# Patient Record
Sex: MALE | Race: ASIAN | NOT HISPANIC OR LATINO | Employment: FULL TIME | URBAN - METROPOLITAN AREA
[De-identification: names, ages, dates, MRNs, and addresses within clinical notes are randomized per-mention and may not be internally consistent; named-entity substitution may affect disease eponyms.]

---

## 2019-01-09 ENCOUNTER — OFFICE VISIT (OUTPATIENT)
Dept: FAMILY MEDICINE | Facility: CLINIC | Age: 24
End: 2019-01-09
Payer: COMMERCIAL

## 2019-01-09 VITALS
OXYGEN SATURATION: 98 % | DIASTOLIC BLOOD PRESSURE: 68 MMHG | TEMPERATURE: 101.6 F | SYSTOLIC BLOOD PRESSURE: 129 MMHG | RESPIRATION RATE: 16 BRPM | WEIGHT: 157 LBS | HEART RATE: 122 BPM

## 2019-01-09 DIAGNOSIS — J06.9 VIRAL URI WITH COUGH: Primary | ICD-10-CM

## 2019-01-09 DIAGNOSIS — R50.9 FEVER, UNSPECIFIED FEVER CAUSE: ICD-10-CM

## 2019-01-09 LAB
DEPRECATED S PYO AG THROAT QL EIA: NORMAL
FLUAV+FLUBV AG SPEC QL: NEGATIVE
FLUAV+FLUBV AG SPEC QL: NEGATIVE
SPECIMEN SOURCE: NORMAL
SPECIMEN SOURCE: NORMAL

## 2019-01-09 PROCEDURE — 87804 INFLUENZA ASSAY W/OPTIC: CPT | Performed by: NURSE PRACTITIONER

## 2019-01-09 PROCEDURE — 87081 CULTURE SCREEN ONLY: CPT | Performed by: NURSE PRACTITIONER

## 2019-01-09 PROCEDURE — 99203 OFFICE O/P NEW LOW 30 MIN: CPT | Performed by: NURSE PRACTITIONER

## 2019-01-09 PROCEDURE — 87880 STREP A ASSAY W/OPTIC: CPT | Performed by: NURSE PRACTITIONER

## 2019-01-09 ASSESSMENT — PAIN SCALES - GENERAL: PAINLEVEL: EXTREME PAIN (8)

## 2019-01-09 NOTE — PATIENT INSTRUCTIONS
Rapid strep negative, awaiting culture. We will call you in the next 24-48 hours only if positive.  Negative influenza  Push fluids, rest  Gargle with warm salt water  Tylenol or ibuprofen as needed for pain or fever  No work until 24 hours after fever is gone  If worsening or not improving in 3-5 days, follow up with primary care provider, sooner if needed      At [facility], we strive to deliver an exceptional experience to you, every time we see you.  If you receive a survey in the mail, please send us back your thoughts. We really do value your feedback.    Based on your medical history, these are the current health maintenance/preventive care services that you are due for (some may have been done at this visit.)  Health Maintenance Due   Topic Date Due     PHQ-2 Q1 YR  05/30/2005     HIV SCREEN (SYSTEM ASSIGNED)  05/30/2011     DTAP/TDAP/TD IMMUNIZATION (1 - Tdap) 05/30/2018     INFLUENZA VACCINE (1) 09/01/2018         Suggested websites for health information:  Www.PixelFlow.org : Up to date and easily searchable information on multiple topics.  Www.medlineplus.gov : medication info, interactive tutorials, watch real surgeries online  Www.familydoctor.org : good info from the Academy of Family Physicians  Www.cdc.gov : public health info, travel advisories, epidemics (H1N1)  Www.aap.org : children's health info, normal development, vaccinations  Www.health.state.[place].us : [place] dept of health, public health issues in [place], N1N1    Your care team:                            Family Medicine Internal Medicine   MD Garrett Bennett MD Shantel Branch-Fleming, MD Katya Georgiev PA-C Nam Ho, MD Pediatrics   SUKHDEEP Rushing, MD Cassandra Escalante CNP, MD Deborah Mielke, MD Kim Thein, APRN CNP      Clinic hours: Monday - Thursday 7 am-7 pm; Fridays 7 am-5 pm.   Urgent care: Monday - Friday 11 am-9 pm; Saturday and Sunday 9  am-5 pm.  Pharmacy : Monday -Thursday 8 am-8 pm; Friday 8 am-6 pm; Saturday and Sunday 9 am-5 pm.     Clinic: [phone]   Pharmacy: [phone]    Patient Education     Viral Upper Respiratory Illness (Adult)  You have a viral upper respiratory illness (URI), which is another term for the common cold. This illness is contagious during the first few days. It is spread through the air by coughing and sneezing. It may also be spread by direct contact (touching the sick person and then touching your own eyes, nose, or mouth). Frequent handwashing will decrease risk of spread. Most viral illnesses go away within 7 to 10 days with rest and simple home remedies. Sometimes the illness may last for several weeks. Antibiotics will not kill a virus, and they are generally not prescribed for this condition.    Home care    If symptoms are severe, rest at home for the first 2 to 3 days. When you resume activity, don't let yourself get too tired.    Don't smoke. If you need help stopping, talk with your healthcare provider.    Avoid being exposed to cigarette smoke (yours or others ).    You may use acetaminophen or ibuprofen to control pain and fever, unless another medicine was prescribed. If you have chronic liver or kidney disease, have ever had a stomach ulcer or gastrointestinal bleeding, or are taking blood-thinning medicines, talk with your healthcare provider before using these medicines. Aspirin should never be given to anyone under 18 years of age who is ill with a viral infection or fever. It may cause severe liver or brain damage.    Your appetite may be poor, so a light diet is fine. Stay well hydrated by drinking 6 to 8 glasses of fluids per day (water, soft drinks, juices, tea, or soup). Extra fluids will help loosen secretions in the nose and lungs.    Over-the-counter cold medicines will not shorten the length of time you re sick, but they may be helpful for the following symptoms: cough, sore throat,  and nasal and sinus congestion. If you take prescription medicines, ask your healthcare provider or pharmacist which over-the-counter medicines are safe to use. (Note: Don't use decongestants if you have high blood pressure.)  Follow-up care  Follow up with your healthcare provider, or as advised.  When to seek medical advice  Call your healthcare provider right away if any of these occur:    Cough with lots of colored sputum (mucus)    Severe headache; face, neck, or ear pain    Difficulty swallowing due to throat pain    Fever of 100.4 F (38 C) or higher, or as directed by your healthcare provider  Call 911  Call 911 if any of these occur:    Chest pain, shortness of breath, wheezing, or difficulty breathing    Coughing up blood    Very severe pain with swallowing, especially if it goes along with a muffled voice   Date Last Reviewed: 6/1/2018 2000-2018 [employer]. [address] [place] [address]. All rights reserved. This information is not intended as a substitute for professional medical care. Always follow your healthcare professional's instructions.           Patient Education     Fever Control (Adult)  A fever is a normal reaction of your body to an illness. The temperature itself usually isn t harmful.  It actually helps your body fight infections. You don t need to treat a fever unless you feel very uncomfortable.   Home care  Follow these tips to take care of yourself at home:    If you feel warm, check your temperature.    Dress in light clothing. This will help you lose extra body heat through your skin. The fever will go up if you wear extra layers or wrap in blankets.    Fever causes your body to lose water through evaporation. Drink plenty of fluids. These include water, juice, clear sodas, ginger ale, or lemonade.  Fever medicines  You can take acetaminophen every 4 to 6 hours if:    You feel very uncomfortable    Your oral temperature is 100.4 F (38 C) or higher  If you  can t take or keep down oral medicine, ask your pharmacist for acetaminophen suppositories. You don t need a prescription for these.  If the fever doesn t get better within 1 hour after you take acetaminophen, take ibuprofen. If this works, keep taking the ibuprofen every 6 to 8 hours.  If you have chronic liver or kidney disease, talk with your healthcare provider before taking these medicines. Also talk with your provider if you ever had a stomach ulcer or GI (gastrointestinal) bleeding.  If either medicine alone doesn t keep the fever down, you may switch off between the 2 medicines every 3 to 4 hours. But do this only if your healthcare provider has told you to. For example, take ibuprofen. Wait 3 hours. Then take acetaminophen. Wait 3 hours. Take ibuprofen, and so on. Follow your provider s instructions exactly.  Don't give aspirin to anyone younger than age 19 who is ill with a fever. Aspirin can cause serious side effects such as liver damage and Reye syndrome. Although rare, Reye syndrome is a very serious illness usually found in children younger than age 15. The syndrome is closely linked to the use of aspirin or aspirin-containing medicine during viral infection.  Follow-up care  Follow up with your healthcare provider if you don't get better after 48 hours.  When to seek medical advice  Call your healthcare provider right away if any of these occur:    Fever, as directed by your healthcare provider, or:  ? Fever of 100.4 F (38 C) or above lasting for 24 to 48 hours  ? Fever lasting more than 3 days, even without other symptoms  ? Fever that happens after visiting a foreign country  ? Fever that happens within a month after visiting a country with malaria. Malaria is a serious illness. A fever can still be malaria even if you took medicine to prevent it. The medicine does not work in all cases.    Confusion or trouble thinking    Headache or stiff neck    Flat, small, purplish red spots on your  skin    Low blood pressure    Fast heart rate    Fast (rapid) breathing    You are pregnant    You just had surgery, another medical procedure, or were just discharged from the hospital    Use of medicines that suppress the immune system (immunosuppressants). These include Prednisone, cancer medicines, and organ transplant rejection medicines. If you are not sure about whether your medicines suppress your immune system, ask your healthcare provider.  Call 911  Someone should call 911 if you:    Are having trouble breathing or shortness of breath    Are unresponsive  Important reminder  Call your healthcare provider if you get a fever after visiting a place where infectious diseases are common. Many people  a cold or other virus while traveling. This usually goes away without a problem. But, some places have more serious diseases. Fever with certain other symptoms may mean you have a serious illness. Symptoms to watch for include diarrhea, skin rashes, insect bites, and skin boils, or infections. Your provider may ask you:    What you did on your trip    How long you were there    Where you stayed (hotel, native house, tent)    What you ate and drank    If you were bitten by insects or other bugs    If you swam in freshwater    If you had sex or got a tattoo or piercing while you were there  Check the CDC to get more information about specific infectious diseases in the areas you have traveled.  Date Last Reviewed: 1/1/2017 2000-2018 [employer]. [address] [place] [address], [place] [address]. All rights reserved. This information is not intended as a substitute for professional medical care. Always follow your healthcare professional's instructions.

## 2019-01-09 NOTE — LETTER
January 9, 2019      Ronald Bell  [address]  [facility] [address]        To Whom It May Concern:    Ronald Bell  was seen on 1/9/2019.  Please excuse him until 24 hours after fever is gone due to illness. He may return to work no sooner than 1/11/2019.        Sincerely,        MARCELA Mays CNP

## 2019-01-09 NOTE — PROGRESS NOTES
SUBJECTIVE:   Ronald Bell is a 23 year old male who presents to clinic today for the following health issues:      Acute Illness   Acute illness concerns: Fever, URI  Onset: 1 day ago    Fever: YES    Chills/Sweats: YES    Headache (location?): YES    Sinus Pressure:no    Conjunctivitis:  no    Ear Pain: no    Rhinorrhea: YES    Congestion: YES    Sore Throat: no      Cough: YES-productive    Wheeze: no     Decreased Appetite: no     Nausea: YES    Vomiting: no     Diarrhea:  no     Dysuria/Freq.: no     Fatigue/Achiness: YES    Sick/Strep Exposure: no      Therapies Tried and outcome: None      No trouble breathing or swallowing. No drooling or trismus. No chest pain, shortness of breath. No wheezing.    Problem list and histories reviewed & adjusted, as indicated.  Additional history: as documented    There is no problem list on file for this patient.    History reviewed. No pertinent surgical history.    Social History     Tobacco Use     Smoking status: Current Every Day Smoker     Smokeless tobacco: Never Used   Substance Use Topics     Alcohol use: Yes     History reviewed. No pertinent family history.      No current outpatient medications on file.     No Known Allergies    Reviewed and updated as needed this visit by clinical staff  Tobacco  Allergies  Meds  Problems  Med Hx  Surg Hx  Fam Hx  Soc Hx        Reviewed and updated as needed this visit by Provider  Tobacco  Allergies  Meds  Problems  Med Hx  Surg Hx  Fam Hx         ROS:  Constitutional, HEENT, cardiovascular, pulmonary, gi and gu systems are negative, except as otherwise noted.    OBJECTIVE:     /68 (BP Location: Right arm, Patient Position: Chair, Cuff Size: Adult Regular)   Pulse 122   Temp 101.6  F (38.7  C) (Oral)   Resp 16   Wt 71.2 kg (157 lb)   SpO2 98%   There is no height or weight on file to calculate BMI.  GENERAL: healthy, alert and no distress  EYES: Eyes grossly normal to inspection, PERRL and  conjunctivae and sclerae normal  HENT: ear canals and TM's normal, nose and mouth without ulcers or lesions. No evidence of peritonsillar abscess   NECK: no adenopathy, no asymmetry, masses, or scars and thyroid normal to palpation  RESP: lungs clear to auscultation - no rales, rhonchi or wheezes  CV: regular rate and rhythm, normal S1 S2, no S3 or S4, no murmur, click or rub, no peripheral edema and peripheral pulses strong  ABDOMEN: soft, nontender, no hepatosplenomegaly, no masses and bowel sounds normal  MS: no gross musculoskeletal defects noted, no edema  PSYCH: mentation appears normal, affect normal/bright    Diagnostic Test Results:  Results for orders placed or performed in visit on 01/09/19 (from the past 24 hour(s))   Influenza A/B antigen   Result Value Ref Range    Influenza A/B Agn Specimen Nasal     Influenza A Negative NEG^Negative    Influenza B Negative NEG^Negative   Rapid strep screen   Result Value Ref Range    Specimen Description Throat     Rapid Strep A Screen       NEGATIVE: No Group A streptococcal antigen detected by immunoassay, await culture report.       ASSESSMENT/PLAN:       ICD-10-CM    1. Viral URI with cough J06.9     B97.89    2. Fever, unspecified fever cause R50.9 Influenza A/B antigen     Rapid strep screen     Beta strep group A culture     Rapid strep negative, awaiting culture. We will call you in the next 24-48 hours only if positive.  Negative influenza  Push fluids, rest  Gargle with warm salt water  Tylenol or ibuprofen as needed for pain or fever  No work until 24 hours after fever is gone  If worsening or not improving in 3-5 days, follow up with primary care provider, sooner if needed  Note written for work    See Patient Instructions    Patient verbalizes understanding and agrees with plan of care. Patient stable for discharge.      MARCELA Mays [facility]

## 2019-01-10 LAB
BACTERIA SPEC CULT: NORMAL
SPECIMEN SOURCE: NORMAL

## 2024-07-06 ENCOUNTER — OFFICE VISIT (OUTPATIENT)
Dept: URGENT CARE | Facility: URGENT CARE | Age: 29
End: 2024-07-06
Payer: COMMERCIAL

## 2024-07-06 VITALS
HEART RATE: 91 BPM | OXYGEN SATURATION: 99 % | SYSTOLIC BLOOD PRESSURE: 129 MMHG | WEIGHT: 155.1 LBS | DIASTOLIC BLOOD PRESSURE: 84 MMHG | TEMPERATURE: 98.4 F

## 2024-07-06 DIAGNOSIS — Z86.19 HISTORY OF GONORRHEA: ICD-10-CM

## 2024-07-06 DIAGNOSIS — R30.0 DYSURIA: ICD-10-CM

## 2024-07-06 DIAGNOSIS — Z86.19 HISTORY OF CHLAMYDIA: ICD-10-CM

## 2024-07-06 DIAGNOSIS — R36.9 PENILE DISCHARGE: Primary | ICD-10-CM

## 2024-07-06 LAB
ALBUMIN UR-MCNC: NEGATIVE MG/DL
APPEARANCE UR: CLEAR
BILIRUB UR QL STRIP: NEGATIVE
COLOR UR AUTO: YELLOW
GLUCOSE UR STRIP-MCNC: NEGATIVE MG/DL
HGB UR QL STRIP: NEGATIVE
KETONES UR STRIP-MCNC: NEGATIVE MG/DL
LEUKOCYTE ESTERASE UR QL STRIP: NEGATIVE
NITRATE UR QL: NEGATIVE
PH UR STRIP: 7 [PH] (ref 5–7)
SP GR UR STRIP: 1.02 (ref 1–1.03)
UROBILINOGEN UR STRIP-ACNC: 0.2 E.U./DL

## 2024-07-06 PROCEDURE — 99203 OFFICE O/P NEW LOW 30 MIN: CPT | Mod: 25 | Performed by: PHYSICIAN ASSISTANT

## 2024-07-06 PROCEDURE — 87591 N.GONORRHOEAE DNA AMP PROB: CPT | Performed by: PHYSICIAN ASSISTANT

## 2024-07-06 PROCEDURE — 87491 CHLMYD TRACH DNA AMP PROBE: CPT | Performed by: PHYSICIAN ASSISTANT

## 2024-07-06 PROCEDURE — 96372 THER/PROPH/DIAG INJ SC/IM: CPT | Performed by: PHYSICIAN ASSISTANT

## 2024-07-06 PROCEDURE — 81003 URINALYSIS AUTO W/O SCOPE: CPT | Performed by: PHYSICIAN ASSISTANT

## 2024-07-06 RX ORDER — CEFTRIAXONE SODIUM 1 G
500 VIAL (EA) INJECTION ONCE
Status: COMPLETED | OUTPATIENT
Start: 2024-07-06 | End: 2024-07-06

## 2024-07-06 RX ORDER — DOXYCYCLINE 100 MG/1
100 CAPSULE ORAL 2 TIMES DAILY
Qty: 20 CAPSULE | Refills: 0 | Status: SHIPPED | OUTPATIENT
Start: 2024-07-06 | End: 2024-07-16

## 2024-07-06 RX ADMIN — Medication 500 MG: at 13:34

## 2024-07-06 NOTE — PROGRESS NOTES
Chief Complaint   Patient presents with    STD     PT having discharge onset yesterday, pain while urinating onset 4 days. Pt had  unprotected sex              ASSESSMENT:    ICD-10-CM    1. Penile discharge  R36.9 NEISSERIA GONORRHOEA PCR     CHLAMYDIA TRACHOMATIS PCR     UA Macroscopic with reflex to Microscopic and Culture - Lab Collect     doxycycline hyclate (VIBRAMYCIN) 100 MG capsule     cefTRIAXone (ROCEPHIN) in lidocaine 1% (PF) for IM administration only 500 mg      2. Dysuria  R30.0 NEISSERIA GONORRHOEA PCR     CHLAMYDIA TRACHOMATIS PCR     UA Macroscopic with reflex to Microscopic and Culture - Lab Collect     doxycycline hyclate (VIBRAMYCIN) 100 MG capsule     cefTRIAXone (ROCEPHIN) in lidocaine 1% (PF) for IM administration only 500 mg      3. History of chlamydia  Z86.19       4. History of gonorrhea  Z86.19               PLAN: History of chlamydia and gonorrhea, states this feels similar.  Oral doxycycline.  IM Rocephin.  UA, chlamydia, gonorrhea test pending.  Will call with results.  . Follow up with primary care physician if not improving.  Advised about symptoms which might herald more serious problems.          Shawna Thompson PA-C        Subjective:   29-year-old male with dysuria for 4 days and penile discharge that started yesterday.  Has had chlamydia and gonorrhea in the past and this seems similar.  Had unprotected sex.  No fever or chills.  No back pain or abdominal pain.      No Known Allergies    No past medical history on file.    No current outpatient medications on file.     No current facility-administered medications for this visit.       Social History     Tobacco Use    Smoking status: Every Day     Types: Cigarettes    Smokeless tobacco: Never   Substance Use Topics    Alcohol use: Yes    Drug use: No       ROS:  General: negative for fever  ABD: Denies abd pain  : as above    OBJECTIVE:  /84 (BP Location: Left arm, Patient Position: Sitting, Cuff Size: Adult Regular)    Pulse 91   Temp 98.4  F (36.9  C) (Tympanic)   Wt 70.4 kg (155 lb 1.6 oz)   SpO2 99%    General:   awake, alert, and cooperative.  NAD.   Head: Normocephalic, atraumatic.  Eyes: Conjunctiva clear, non icteric.   Neuro: Alert and oriented - normal speech.

## 2024-07-06 NOTE — NURSING NOTE
Clinic Administered Medication Documentation        Patient was given ROCEPHIN. Prior to medication administration, verified patient's identity using patient s name and date of birth. Please see MAR and medication order for additional information. Patient instructed to remain in clinic for 15 minutes, report any adverse reaction to staff immediately, and remain in clinic for 15 minutes and report any adverse reaction to staff immediately but patient declined.    Vial/Syringe: Single dose vial. Was entire vial of medication used? Yes     Vickie Izaguirre MA

## 2024-07-07 LAB
C TRACH DNA SPEC QL NAA+PROBE: NEGATIVE
N GONORRHOEA DNA SPEC QL NAA+PROBE: POSITIVE